# Patient Record
Sex: FEMALE | Race: WHITE | NOT HISPANIC OR LATINO | URBAN - METROPOLITAN AREA
[De-identification: names, ages, dates, MRNs, and addresses within clinical notes are randomized per-mention and may not be internally consistent; named-entity substitution may affect disease eponyms.]

---

## 2023-04-24 ENCOUNTER — TELEPHONE (OUTPATIENT)
Dept: PRIMARY CARE | Facility: CLINIC | Age: 33
End: 2023-04-24
Payer: COMMERCIAL

## 2023-04-26 ENCOUNTER — APPOINTMENT (OUTPATIENT)
Dept: PRIMARY CARE | Facility: CLINIC | Age: 33
End: 2023-04-26
Payer: COMMERCIAL

## 2023-06-10 DIAGNOSIS — F41.9 ANXIETY DISORDER, UNSPECIFIED: ICD-10-CM

## 2023-06-14 RX ORDER — FLUOXETINE HYDROCHLORIDE 40 MG/1
CAPSULE ORAL
Qty: 30 CAPSULE | Refills: 0 | Status: SHIPPED | OUTPATIENT
Start: 2023-06-14 | End: 2023-07-12 | Stop reason: SDUPTHER

## 2023-07-12 ENCOUNTER — OFFICE VISIT (OUTPATIENT)
Dept: PRIMARY CARE | Facility: CLINIC | Age: 33
End: 2023-07-12
Payer: COMMERCIAL

## 2023-07-12 VITALS
SYSTOLIC BLOOD PRESSURE: 96 MMHG | RESPIRATION RATE: 16 BRPM | TEMPERATURE: 99.4 F | OXYGEN SATURATION: 98 % | DIASTOLIC BLOOD PRESSURE: 72 MMHG | HEART RATE: 74 BPM

## 2023-07-12 DIAGNOSIS — F41.9 ANXIETY DISORDER, UNSPECIFIED: ICD-10-CM

## 2023-07-12 PROCEDURE — 1036F TOBACCO NON-USER: CPT | Performed by: FAMILY MEDICINE

## 2023-07-12 PROCEDURE — 99213 OFFICE O/P EST LOW 20 MIN: CPT | Performed by: FAMILY MEDICINE

## 2023-07-12 RX ORDER — FLUOXETINE HYDROCHLORIDE 40 MG/1
40 CAPSULE ORAL DAILY
Qty: 90 CAPSULE | Refills: 1 | Status: SHIPPED | OUTPATIENT
Start: 2023-07-12 | End: 2024-02-23 | Stop reason: SDUPTHER

## 2023-07-12 NOTE — PROGRESS NOTES
Subjective   Patient ID: Megan Morris is a 33 y.o. female who presents for Anxiety (Follow up ).    Anxiety  Patient is here for evaluation of anxiety.  She has the following anxiety symptoms: palpitations. Onset of symptoms was approximately several years ago.  Symptoms have been stable since that time. She denies current suicidal and homicidal ideation. Family history significant for anxiety.Possible organic causes contributing are: none. Risk factors: positive family history in  father and sister(s) Previous treatment includes individual therapy and medication Prozac.   She complains of the following medication side effects: none.    Talking to a therapist and this is working well for her.    Anxiety             Review of Systems    Objective   BP 96/72   Pulse 74   Temp 37.4 °C (99.4 °F)   Resp 16   LMP 06/18/2023   SpO2 98%     Physical Exam  Constitutional:       Appearance: Normal appearance.   HENT:      Head: Normocephalic and atraumatic.      Mouth/Throat:      Mouth: Mucous membranes are moist.   Eyes:      Pupils: Pupils are equal, round, and reactive to light.   Cardiovascular:      Rate and Rhythm: Normal rate.      Pulses: Normal pulses.   Pulmonary:      Effort: Pulmonary effort is normal. No respiratory distress.   Musculoskeletal:      Cervical back: Normal range of motion and neck supple.   Skin:     General: Skin is warm and dry.   Neurological:      General: No focal deficit present.      Mental Status: She is alert and oriented to person, place, and time. Mental status is at baseline.   Psychiatric:         Mood and Affect: Mood normal.         Behavior: Behavior normal.         Thought Content: Thought content normal.         Judgment: Judgment normal.         Assessment/Plan   Diagnoses and all orders for this visit:  Anxiety disorder, unspecified  -     FLUoxetine (PROzac) 40 mg capsule; Take 1 capsule (40 mg) by mouth once daily.

## 2023-07-12 NOTE — PATIENT INSTRUCTIONS
Today we followed up on your anxiety.  You are doing great on your current medication.  Refills Sent.  Consider CBT/therapy to help with other underlying issues if needed.      Please make an appointment to follow up for your Annual Physical.

## 2023-09-13 ENCOUNTER — OFFICE VISIT (OUTPATIENT)
Dept: PRIMARY CARE | Facility: CLINIC | Age: 33
End: 2023-09-13
Payer: COMMERCIAL

## 2023-09-13 VITALS
BODY MASS INDEX: 22.14 KG/M2 | TEMPERATURE: 98.1 F | HEIGHT: 61 IN | HEART RATE: 80 BPM | SYSTOLIC BLOOD PRESSURE: 94 MMHG | DIASTOLIC BLOOD PRESSURE: 70 MMHG | RESPIRATION RATE: 16 BRPM | OXYGEN SATURATION: 96 %

## 2023-09-13 DIAGNOSIS — R42 VERTIGO: ICD-10-CM

## 2023-09-13 DIAGNOSIS — H81.12 BPPV (BENIGN PAROXYSMAL POSITIONAL VERTIGO), LEFT: Primary | ICD-10-CM

## 2023-09-13 LAB
POC APPEARANCE, URINE: CLEAR
POC BILIRUBIN, URINE: NEGATIVE
POC BLOOD, URINE: NEGATIVE
POC COLOR, URINE: YELLOW
POC GLUCOSE, URINE: NEGATIVE MG/DL
POC KETONES, URINE: NEGATIVE MG/DL
POC LEUKOCYTES, URINE: NEGATIVE
POC NITRITE,URINE: NEGATIVE
POC PH, URINE: 7 PH
POC PROTEIN, URINE: NEGATIVE MG/DL
POC SPECIFIC GRAVITY, URINE: 1.01
POC UROBILINOGEN, URINE: 0.2 EU/DL
PREGNANCY TEST URINE, POC: NEGATIVE

## 2023-09-13 PROCEDURE — 81025 URINE PREGNANCY TEST: CPT | Performed by: FAMILY MEDICINE

## 2023-09-13 PROCEDURE — 81003 URINALYSIS AUTO W/O SCOPE: CPT | Performed by: FAMILY MEDICINE

## 2023-09-13 PROCEDURE — 1036F TOBACCO NON-USER: CPT | Performed by: FAMILY MEDICINE

## 2023-09-13 PROCEDURE — 99214 OFFICE O/P EST MOD 30 MIN: CPT | Performed by: FAMILY MEDICINE

## 2023-09-13 RX ORDER — MECLIZINE HCL 12.5 MG 12.5 MG/1
12.5 TABLET ORAL 3 TIMES DAILY PRN
Qty: 30 TABLET | Refills: 0 | Status: SHIPPED | OUTPATIENT
Start: 2023-09-13 | End: 2023-10-31 | Stop reason: ALTCHOICE

## 2023-09-13 ASSESSMENT — ENCOUNTER SYMPTOMS: DIZZINESS: 1

## 2023-09-13 NOTE — PATIENT INSTRUCTIONS
Today we have addressed your BPPV.    I have given you a handout for the Epley maneuver and rx antivert for you  I advise not going to the chiropractor tomorrow until your symptoms have resolved  Pregnancy was ruled out as well.      Follow up if no improvement or if symptoms worsen.

## 2023-09-13 NOTE — PROGRESS NOTES
"Subjective   Patient ID: Megan Morris is a 33 y.o. female who presents for Dizziness (Happened on Friday, last several hours. She fell over, did not lose consciousness.  The last couple of days she has felt dizzy on and off. Staying hydrated.  ).    She was doing yoga on Friday around lunch time.  She had eaten right before class.  It was at her house.  She did trichonasana and then stood up and fell backwards.  She just laid for a little while until she felt ok.  She felt a little nauseous as well.  The dizziness lasted a few hours.  She didn't feel her heart racing. She thinks maybe the room was spinning a little.  She denies any recent illness.    For the last 3 days she has felt off balance as well.  She is not currently on her period. She doesn't think she is pregnant she has no tubes.    She went to a chiropractor this morning because she was having neck pain he didn't do any treatment today though.      Dizziness         Review of Systems   Neurological:  Positive for dizziness.       Objective   BP 94/70   Pulse 80   Temp 36.7 °C (98.1 °F)   Resp 16   Ht 1.549 m (5' 1\")   LMP 08/18/2023 (Approximate)   SpO2 96%   BMI 22.14 kg/m²     Physical Exam  Constitutional:       Appearance: Normal appearance.   HENT:      Head: Normocephalic.      Right Ear: Tympanic membrane normal.      Left Ear: Tympanic membrane normal.      Nose: Nose normal.      Mouth/Throat:      Mouth: Mucous membranes are moist.   Eyes:      Pupils: Pupils are equal, round, and reactive to light.   Cardiovascular:      Rate and Rhythm: Normal rate and regular rhythm.   Pulmonary:      Effort: Pulmonary effort is normal.      Breath sounds: Normal breath sounds.   Musculoskeletal:      Cervical back: Normal range of motion.   Skin:     General: Skin is warm.   Neurological:      General: No focal deficit present.      Mental Status: She is alert and oriented to person, place, and time.      Coordination: Coordination normal.      " Deep Tendon Reflexes: Reflexes normal.      Comments: +joyce hallpike to the left   Psychiatric:         Mood and Affect: Mood normal.         Assessment/Plan   There are no diagnoses linked to this encounter.

## 2023-10-13 ENCOUNTER — APPOINTMENT (OUTPATIENT)
Dept: PRIMARY CARE | Facility: CLINIC | Age: 33
End: 2023-10-13
Payer: COMMERCIAL

## 2023-10-31 ENCOUNTER — OFFICE VISIT (OUTPATIENT)
Dept: PRIMARY CARE | Facility: CLINIC | Age: 33
End: 2023-10-31
Payer: COMMERCIAL

## 2023-10-31 ENCOUNTER — LAB (OUTPATIENT)
Dept: LAB | Facility: LAB | Age: 33
End: 2023-10-31
Payer: COMMERCIAL

## 2023-10-31 VITALS
TEMPERATURE: 98 F | SYSTOLIC BLOOD PRESSURE: 118 MMHG | DIASTOLIC BLOOD PRESSURE: 70 MMHG | RESPIRATION RATE: 16 BRPM | HEART RATE: 76 BPM

## 2023-10-31 DIAGNOSIS — Z00.00 WELL ADULT EXAM: ICD-10-CM

## 2023-10-31 DIAGNOSIS — Z00.00 WELL ADULT EXAM: Primary | ICD-10-CM

## 2023-10-31 LAB
ANION GAP SERPL CALC-SCNC: 14 MMOL/L (ref 10–20)
BUN SERPL-MCNC: 14 MG/DL (ref 6–23)
CALCIUM SERPL-MCNC: 9.8 MG/DL (ref 8.6–10.3)
CHLORIDE SERPL-SCNC: 101 MMOL/L (ref 98–107)
CHOLEST SERPL-MCNC: 192 MG/DL (ref 0–199)
CHOLESTEROL/HDL RATIO: 2.7
CO2 SERPL-SCNC: 27 MMOL/L (ref 21–32)
CREAT SERPL-MCNC: 0.83 MG/DL (ref 0.5–1.05)
ERYTHROCYTE [DISTWIDTH] IN BLOOD BY AUTOMATED COUNT: 11.8 % (ref 11.5–14.5)
GFR SERPL CREATININE-BSD FRML MDRD: >90 ML/MIN/1.73M*2
GLUCOSE SERPL-MCNC: 85 MG/DL (ref 74–99)
HCT VFR BLD AUTO: 41.2 % (ref 36–46)
HDLC SERPL-MCNC: 71.4 MG/DL
HGB BLD-MCNC: 14.3 G/DL (ref 12–16)
LDLC SERPL CALC-MCNC: 95 MG/DL
MCH RBC QN AUTO: 32.6 PG (ref 26–34)
MCHC RBC AUTO-ENTMCNC: 34.7 G/DL (ref 32–36)
MCV RBC AUTO: 94 FL (ref 80–100)
NON HDL CHOLESTEROL: 121 MG/DL (ref 0–149)
NRBC BLD-RTO: 0 /100 WBCS (ref 0–0)
PLATELET # BLD AUTO: 247 X10*3/UL (ref 150–450)
PMV BLD AUTO: 10.8 FL (ref 7.5–11.5)
POTASSIUM SERPL-SCNC: 4.2 MMOL/L (ref 3.5–5.3)
RBC # BLD AUTO: 4.39 X10*6/UL (ref 4–5.2)
SODIUM SERPL-SCNC: 138 MMOL/L (ref 136–145)
TRIGL SERPL-MCNC: 128 MG/DL (ref 0–149)
VLDL: 26 MG/DL (ref 0–40)
WBC # BLD AUTO: 5.7 X10*3/UL (ref 4.4–11.3)

## 2023-10-31 PROCEDURE — 85027 COMPLETE CBC AUTOMATED: CPT

## 2023-10-31 PROCEDURE — 80048 BASIC METABOLIC PNL TOTAL CA: CPT

## 2023-10-31 PROCEDURE — 1036F TOBACCO NON-USER: CPT

## 2023-10-31 PROCEDURE — 36415 COLL VENOUS BLD VENIPUNCTURE: CPT

## 2023-10-31 PROCEDURE — 99395 PREV VISIT EST AGE 18-39: CPT

## 2023-10-31 PROCEDURE — 80061 LIPID PANEL: CPT

## 2023-11-27 ENCOUNTER — APPOINTMENT (OUTPATIENT)
Dept: PRIMARY CARE | Facility: CLINIC | Age: 33
End: 2023-11-27
Payer: COMMERCIAL

## 2023-12-13 ENCOUNTER — APPOINTMENT (OUTPATIENT)
Dept: PRIMARY CARE | Facility: CLINIC | Age: 33
End: 2023-12-13
Payer: COMMERCIAL

## 2024-02-07 ENCOUNTER — TELEPHONE (OUTPATIENT)
Dept: PRIMARY CARE | Facility: CLINIC | Age: 34
End: 2024-02-07
Payer: COMMERCIAL

## 2024-02-07 NOTE — TELEPHONE ENCOUNTER
Patient would like a referral placed for PT for her neck pain.  She said she has had neck pain for over a year and a half and has been seeing a Chiropractor, ayse that is not helping.

## 2024-02-23 DIAGNOSIS — F41.9 ANXIETY DISORDER, UNSPECIFIED: ICD-10-CM

## 2024-02-23 RX ORDER — FLUOXETINE HYDROCHLORIDE 40 MG/1
40 CAPSULE ORAL DAILY
Qty: 30 CAPSULE | Refills: 0 | Status: SHIPPED | OUTPATIENT
Start: 2024-02-23 | End: 2024-02-26 | Stop reason: SDUPTHER

## 2024-02-26 DIAGNOSIS — F41.9 ANXIETY DISORDER, UNSPECIFIED: ICD-10-CM

## 2024-02-26 RX ORDER — FLUOXETINE HYDROCHLORIDE 40 MG/1
40 CAPSULE ORAL DAILY
Qty: 90 CAPSULE | Refills: 0 | Status: SHIPPED | OUTPATIENT
Start: 2024-02-26 | End: 2024-02-28 | Stop reason: SDUPTHER

## 2024-02-26 RX ORDER — FLUOXETINE HYDROCHLORIDE 40 MG/1
40 CAPSULE ORAL DAILY
Qty: 90 CAPSULE | Refills: 3 | Status: SHIPPED | OUTPATIENT
Start: 2024-02-26 | End: 2024-02-26 | Stop reason: SDUPTHER

## 2024-02-28 ENCOUNTER — OFFICE VISIT (OUTPATIENT)
Dept: PRIMARY CARE | Facility: CLINIC | Age: 34
End: 2024-02-28
Payer: COMMERCIAL

## 2024-02-28 VITALS
OXYGEN SATURATION: 97 % | RESPIRATION RATE: 16 BRPM | HEART RATE: 87 BPM | SYSTOLIC BLOOD PRESSURE: 102 MMHG | DIASTOLIC BLOOD PRESSURE: 68 MMHG | TEMPERATURE: 98.1 F

## 2024-02-28 DIAGNOSIS — F41.9 ANXIETY: Primary | ICD-10-CM

## 2024-02-28 DIAGNOSIS — M54.2 NECK PAIN: ICD-10-CM

## 2024-02-28 PROCEDURE — 99214 OFFICE O/P EST MOD 30 MIN: CPT | Performed by: FAMILY MEDICINE

## 2024-02-28 PROCEDURE — 1036F TOBACCO NON-USER: CPT | Performed by: FAMILY MEDICINE

## 2024-02-28 RX ORDER — FLUOXETINE HYDROCHLORIDE 40 MG/1
40 CAPSULE ORAL DAILY
Qty: 90 CAPSULE | Refills: 1 | Status: SHIPPED | OUTPATIENT
Start: 2024-02-28 | End: 2025-02-27

## 2024-02-28 NOTE — PATIENT INSTRUCTIONS
Today we followed up on your anxiety.  You are doing great on your current medication.  Refills Sent.  Continue CBT/therapy to help with other underlying issues if needed.     For your neck pain I have advised Xray to make sure there is no underlying structural issue and that you also follow up with PT.  Consider nsaids prn and maybe a muscle relaxer if we see spasm on the xray as well

## 2024-02-28 NOTE — PROGRESS NOTES
Subjective   Patient ID: Megan Morris is a 33 y.o. female who presents for Anxiety (Follow up ).    She is doing well on the fluoxetine.  She doesn't sleep well because her kids don't sleep well.  Her kids come into her bed in the middle of the night.    She talks to someone as well and has no depression symptoms at all    She has been having neck pain.  She denies any injury.  She states its been there for almost 2 years.  Cannot pinpoint what triggered it.  No numbness or tingling or weakness in the arms.  Sometimes she has to take ibuprofen and it kind of helps.  No injury.  She saw a chiropractor and it didn't stick.  She went to for months.    Anxiety             Review of Systems    Objective   /68   Pulse 87   Temp 36.7 °C (98.1 °F)   Resp 16   LMP 02/08/2024 (Approximate)   SpO2 97%     Physical Exam  Constitutional:       Appearance: Normal appearance.   HENT:      Head: Normocephalic and atraumatic.      Mouth/Throat:      Mouth: Mucous membranes are moist.   Eyes:      Pupils: Pupils are equal, round, and reactive to light.   Cardiovascular:      Rate and Rhythm: Normal rate.      Pulses: Normal pulses.   Musculoskeletal:         General: Tenderness (ttp OA bilaterally, decreased ROM to extension, left side bending and rotation) present.      Cervical back: Normal range of motion and neck supple.   Skin:     General: Skin is warm and dry.   Neurological:      General: No focal deficit present.      Mental Status: She is alert and oriented to person, place, and time. Mental status is at baseline.   Psychiatric:         Mood and Affect: Mood normal.         Behavior: Behavior normal.         Thought Content: Thought content normal.         Judgment: Judgment normal.         Assessment/Plan   Diagnoses and all orders for this visit:  Anxiety  -     FLUoxetine (PROzac) 40 mg capsule; Take 1 capsule (40 mg) by mouth once daily.  Neck pain  -     XR cervical spine 2-3 views; Future  -      Referral to Physical Therapy; Future

## 2024-10-28 ENCOUNTER — APPOINTMENT (OUTPATIENT)
Dept: PRIMARY CARE | Facility: CLINIC | Age: 34
End: 2024-10-28
Payer: COMMERCIAL

## 2024-10-28 VITALS
RESPIRATION RATE: 16 BRPM | BODY MASS INDEX: 22.14 KG/M2 | HEART RATE: 87 BPM | DIASTOLIC BLOOD PRESSURE: 66 MMHG | OXYGEN SATURATION: 98 % | HEIGHT: 61 IN | TEMPERATURE: 98.3 F | SYSTOLIC BLOOD PRESSURE: 102 MMHG

## 2024-10-28 DIAGNOSIS — Z00.00 HEALTHCARE MAINTENANCE: Primary | ICD-10-CM

## 2024-10-28 DIAGNOSIS — F41.9 ANXIETY: ICD-10-CM

## 2024-10-28 DIAGNOSIS — Z12.4 CERVICAL CANCER SCREENING: ICD-10-CM

## 2024-10-28 LAB
POC APPEARANCE, URINE: CLEAR
POC BILIRUBIN, URINE: NEGATIVE
POC BLOOD, URINE: NEGATIVE
POC COLOR, URINE: YELLOW
POC GLUCOSE, URINE: NEGATIVE MG/DL
POC KETONES, URINE: NEGATIVE MG/DL
POC LEUKOCYTES, URINE: NEGATIVE
POC NITRITE,URINE: NEGATIVE
POC PH, URINE: 7.5 PH
POC PROTEIN, URINE: NEGATIVE MG/DL
POC SPECIFIC GRAVITY, URINE: 1.01
POC UROBILINOGEN, URINE: 0.2 EU/DL

## 2024-10-28 PROCEDURE — 81002 URINALYSIS NONAUTO W/O SCOPE: CPT | Performed by: FAMILY MEDICINE

## 2024-10-28 PROCEDURE — 1036F TOBACCO NON-USER: CPT | Performed by: FAMILY MEDICINE

## 2024-10-28 PROCEDURE — 99395 PREV VISIT EST AGE 18-39: CPT | Performed by: FAMILY MEDICINE

## 2024-10-28 RX ORDER — MULTIVIT-MIN/IRON FUM/FOLIC AC 7.5 MG-4
1 TABLET ORAL DAILY
COMMUNITY

## 2024-10-28 RX ORDER — FLUOXETINE HYDROCHLORIDE 40 MG/1
40 CAPSULE ORAL DAILY
Qty: 90 CAPSULE | Refills: 1 | Status: SHIPPED | OUTPATIENT
Start: 2024-10-28 | End: 2025-10-28

## 2024-10-29 ENCOUNTER — LAB (OUTPATIENT)
Dept: LAB | Facility: LAB | Age: 34
End: 2024-10-29
Payer: COMMERCIAL

## 2024-10-29 DIAGNOSIS — Z00.00 HEALTHCARE MAINTENANCE: ICD-10-CM

## 2024-10-29 LAB
25(OH)D3 SERPL-MCNC: 26 NG/ML (ref 30–100)
ALBUMIN SERPL BCP-MCNC: 4.3 G/DL (ref 3.4–5)
ALP SERPL-CCNC: 34 U/L (ref 33–110)
ALT SERPL W P-5'-P-CCNC: 20 U/L (ref 7–45)
ANION GAP SERPL CALC-SCNC: 9 MMOL/L (ref 10–20)
AST SERPL W P-5'-P-CCNC: 21 U/L (ref 9–39)
BILIRUB SERPL-MCNC: 0.6 MG/DL (ref 0–1.2)
BUN SERPL-MCNC: 11 MG/DL (ref 6–23)
CALCIUM SERPL-MCNC: 9.2 MG/DL (ref 8.6–10.3)
CHLORIDE SERPL-SCNC: 102 MMOL/L (ref 98–107)
CHOLEST SERPL-MCNC: 187 MG/DL (ref 0–199)
CHOLESTEROL/HDL RATIO: 2.8
CO2 SERPL-SCNC: 31 MMOL/L (ref 21–32)
CREAT SERPL-MCNC: 0.77 MG/DL (ref 0.5–1.05)
EGFRCR SERPLBLD CKD-EPI 2021: >90 ML/MIN/1.73M*2
ERYTHROCYTE [DISTWIDTH] IN BLOOD BY AUTOMATED COUNT: 11.9 % (ref 11.5–14.5)
GLUCOSE SERPL-MCNC: 86 MG/DL (ref 74–99)
HCT VFR BLD AUTO: 40.5 % (ref 36–46)
HDLC SERPL-MCNC: 66.6 MG/DL
HGB BLD-MCNC: 13.4 G/DL (ref 12–16)
LDLC SERPL CALC-MCNC: 96 MG/DL
MCH RBC QN AUTO: 32 PG (ref 26–34)
MCHC RBC AUTO-ENTMCNC: 33.1 G/DL (ref 32–36)
MCV RBC AUTO: 97 FL (ref 80–100)
NON HDL CHOLESTEROL: 120 MG/DL (ref 0–149)
NRBC BLD-RTO: 0 /100 WBCS (ref 0–0)
PLATELET # BLD AUTO: 223 X10*3/UL (ref 150–450)
POTASSIUM SERPL-SCNC: 4.4 MMOL/L (ref 3.5–5.3)
PROT SERPL-MCNC: 6.3 G/DL (ref 6.4–8.2)
RBC # BLD AUTO: 4.19 X10*6/UL (ref 4–5.2)
SODIUM SERPL-SCNC: 138 MMOL/L (ref 136–145)
TRIGL SERPL-MCNC: 122 MG/DL (ref 0–149)
TSH SERPL-ACNC: 1.37 MIU/L (ref 0.44–3.98)
VLDL: 24 MG/DL (ref 0–40)
WBC # BLD AUTO: 4 X10*3/UL (ref 4.4–11.3)

## 2024-10-29 PROCEDURE — 84443 ASSAY THYROID STIM HORMONE: CPT

## 2024-10-29 PROCEDURE — 36415 COLL VENOUS BLD VENIPUNCTURE: CPT

## 2024-10-29 PROCEDURE — 80061 LIPID PANEL: CPT

## 2024-10-29 PROCEDURE — 85027 COMPLETE CBC AUTOMATED: CPT

## 2024-10-29 PROCEDURE — 82306 VITAMIN D 25 HYDROXY: CPT

## 2024-10-29 PROCEDURE — 80053 COMPREHEN METABOLIC PANEL: CPT

## 2024-10-30 DIAGNOSIS — R79.89 ABNORMAL CBC: ICD-10-CM

## 2024-10-30 DIAGNOSIS — E55.9 HYPOVITAMINOSIS D: Primary | ICD-10-CM

## 2024-10-30 RX ORDER — ERGOCALCIFEROL 1.25 MG/1
50000 CAPSULE ORAL
Qty: 12 CAPSULE | Refills: 0 | Status: SHIPPED | OUTPATIENT
Start: 2024-11-03 | End: 2025-01-26

## 2025-01-23 ENCOUNTER — TELEPHONE (OUTPATIENT)
Facility: CLINIC | Age: 35
End: 2025-01-23
Payer: COMMERCIAL

## 2025-01-24 DIAGNOSIS — E55.9 HYPOVITAMINOSIS D: Primary | ICD-10-CM

## 2025-01-24 NOTE — PROGRESS NOTES
Current Outpatient Medications   Medication Sig Dispense Refill    ergocalciferol (Vitamin D-2) 1.25 MG (63723 UT) capsule Take 1 capsule (50,000 Units) by mouth 1 (one) time per week. 12 capsule 0    FLUoxetine (PROzac) 40 mg capsule Take 1 capsule (40 mg) by mouth once daily. 90 capsule 1    multivitamin with minerals tablet Take 1 tablet by mouth once daily.       No current facility-administered medications for this visit.

## 2025-01-28 DIAGNOSIS — E55.9 HYPOVITAMINOSIS D: ICD-10-CM

## 2025-01-29 RX ORDER — ERGOCALCIFEROL 1.25 MG/1
50000 CAPSULE ORAL
Qty: 12 CAPSULE | Refills: 0 | OUTPATIENT
Start: 2025-02-02

## 2025-02-06 ENCOUNTER — TELEPHONE (OUTPATIENT)
Facility: CLINIC | Age: 35
End: 2025-02-06
Payer: COMMERCIAL

## 2025-02-06 DIAGNOSIS — R79.89 ABNORMAL CBC: Primary | ICD-10-CM

## 2025-02-06 LAB
25(OH)D3+25(OH)D2 SERPL-MCNC: 65 NG/ML (ref 30–100)
BASOPHILS # BLD AUTO: 32 CELLS/UL (ref 0–200)
BASOPHILS NFR BLD AUTO: 0.9 %
EOSINOPHIL # BLD AUTO: 49 CELLS/UL (ref 15–500)
EOSINOPHIL NFR BLD AUTO: 1.4 %
ERYTHROCYTE [DISTWIDTH] IN BLOOD BY AUTOMATED COUNT: 11.6 % (ref 11–15)
HCT VFR BLD AUTO: 43 % (ref 35–45)
HGB BLD-MCNC: 14.4 G/DL (ref 11.7–15.5)
LYMPHOCYTES # BLD AUTO: 1127 CELLS/UL (ref 850–3900)
LYMPHOCYTES NFR BLD AUTO: 32.2 %
MCH RBC QN AUTO: 32.2 PG (ref 27–33)
MCHC RBC AUTO-ENTMCNC: 33.5 G/DL (ref 32–36)
MCV RBC AUTO: 96.2 FL (ref 80–100)
MONOCYTES # BLD AUTO: 270 CELLS/UL (ref 200–950)
MONOCYTES NFR BLD AUTO: 7.7 %
NEUTROPHILS # BLD AUTO: 2023 CELLS/UL (ref 1500–7800)
NEUTROPHILS NFR BLD AUTO: 57.8 %
PLATELET # BLD AUTO: 237 THOUSAND/UL (ref 140–400)
PMV BLD REES-ECKER: 10.9 FL (ref 7.5–12.5)
RBC # BLD AUTO: 4.47 MILLION/UL (ref 3.8–5.1)
WBC # BLD AUTO: 3.5 THOUSAND/UL (ref 3.8–10.8)

## 2025-02-10 ENCOUNTER — TELEPHONE (OUTPATIENT)
Facility: CLINIC | Age: 35
End: 2025-02-10
Payer: COMMERCIAL

## 2025-02-10 NOTE — TELEPHONE ENCOUNTER
Please relay the following lab results to patient:     Per Dr. Norton: Your white blood cells continue to be slightly low, but your white blood cell differential is normal. I would like to check a B12 level because sometimes that can contribute to abnormalities. Well, I am not too concerned. I would like to know why your white blood cells are low just so that we can understand from the future. I can place a referral for hematology for you, but it is not an emergency. Please let me know if you have any questions.

## 2025-02-12 LAB — VIT B12 SERPL-MCNC: 546 PG/ML (ref 200–1100)

## 2025-05-01 DIAGNOSIS — F41.9 ANXIETY: ICD-10-CM

## 2025-05-02 NOTE — TELEPHONE ENCOUNTER
Pt  needs appt before any more refills.  Pt is due for a 6 mos med f-up.  Can you please schedule?

## 2025-05-05 RX ORDER — FLUOXETINE HYDROCHLORIDE 40 MG/1
40 CAPSULE ORAL DAILY
Qty: 30 CAPSULE | Refills: 0 | Status: SHIPPED | OUTPATIENT
Start: 2025-05-05

## 2025-05-28 ENCOUNTER — TELEPHONE (OUTPATIENT)
Facility: CLINIC | Age: 35
End: 2025-05-28
Payer: COMMERCIAL

## 2025-05-28 DIAGNOSIS — F41.9 ANXIETY: ICD-10-CM

## 2025-05-28 RX ORDER — FLUOXETINE HYDROCHLORIDE 40 MG/1
40 CAPSULE ORAL DAILY
Qty: 90 CAPSULE | Refills: 0 | Status: SHIPPED | OUTPATIENT
Start: 2025-05-28 | End: 2025-06-02 | Stop reason: SDUPTHER

## 2025-06-02 ENCOUNTER — APPOINTMENT (OUTPATIENT)
Facility: CLINIC | Age: 35
End: 2025-06-02
Payer: COMMERCIAL

## 2025-06-02 VITALS
TEMPERATURE: 98.2 F | SYSTOLIC BLOOD PRESSURE: 110 MMHG | HEART RATE: 77 BPM | DIASTOLIC BLOOD PRESSURE: 72 MMHG | OXYGEN SATURATION: 98 % | RESPIRATION RATE: 16 BRPM

## 2025-06-02 DIAGNOSIS — F41.9 ANXIETY: ICD-10-CM

## 2025-06-02 PROCEDURE — 99213 OFFICE O/P EST LOW 20 MIN: CPT | Performed by: FAMILY MEDICINE

## 2025-06-02 RX ORDER — FLUOXETINE HYDROCHLORIDE 40 MG/1
40 CAPSULE ORAL DAILY
Qty: 90 CAPSULE | Refills: 1 | Status: SHIPPED | OUTPATIENT
Start: 2025-06-02

## 2025-06-02 NOTE — PROGRESS NOTES
Subjective   Patient ID: Megan Morris is a 35 y.o. female who presents for Anxiety.      We are following up on her fluoxetine no changes.  She is sleeping ok, appetite and energy.  She talks to someone therapist every 3 to 4 weeks and a couples counselor every 2 weeks.    Anxiety            Review of Systems    Current Outpatient Medications   Medication Instructions    FLUoxetine (PROZAC) 40 mg, oral, Daily    multivitamin with minerals tablet 1 tablet, oral, Daily       RX Allergies[1]  Patient has no known allergies.    Past Medical History:  07/12/2023: Anxiety    Social History     Tobacco Use    Smoking status: Never    Smokeless tobacco: Never   Substance Use Topics    Alcohol use: Yes     Comment: socially       Objective     Vitals:    06/02/25 1030   BP: 110/72   Pulse: 77   Resp: 16   Temp: 36.8 °C (98.2 °F)   SpO2: 98%     Patient's last menstrual period was 05/31/2025.    Physical Exam  Constitutional:       Appearance: Normal appearance.   HENT:      Head: Normocephalic and atraumatic.      Mouth/Throat:      Mouth: Mucous membranes are moist.   Eyes:      Pupils: Pupils are equal, round, and reactive to light.   Cardiovascular:      Rate and Rhythm: Normal rate.      Pulses: Normal pulses.   Musculoskeletal:      Cervical back: Normal range of motion and neck supple.   Skin:     General: Skin is warm and dry.   Neurological:      General: No focal deficit present.      Mental Status: She is alert and oriented to person, place, and time. Mental status is at baseline.   Psychiatric:         Mood and Affect: Mood normal.         Behavior: Behavior normal.         Thought Content: Thought content normal.         Judgment: Judgment normal.         Assessment/Plan   Diagnoses and all orders for this visit:  Anxiety  -     FLUoxetine (PROzac) 40 mg capsule; Take 1 capsule (40 mg) by mouth once daily.                 [1] No Known Allergies

## 2025-06-26 ENCOUNTER — TELEPHONE (OUTPATIENT)
Facility: CLINIC | Age: 35
End: 2025-06-26
Payer: COMMERCIAL

## 2025-06-26 NOTE — TELEPHONE ENCOUNTER
Patient would like to know if you could send something in for her to help with her Anxiety when she's flying.          [facility] - [place], [place] - [address] [facility] [address]  [address] [address] [address]  Phone: [phone]  Fax: [phone]

## 2025-07-11 ENCOUNTER — APPOINTMENT (OUTPATIENT)
Facility: CLINIC | Age: 35
End: 2025-07-11
Payer: COMMERCIAL

## 2025-08-06 ENCOUNTER — APPOINTMENT (OUTPATIENT)
Facility: CLINIC | Age: 35
End: 2025-08-06
Payer: COMMERCIAL

## 2025-08-06 VITALS
SYSTOLIC BLOOD PRESSURE: 108 MMHG | DIASTOLIC BLOOD PRESSURE: 64 MMHG | RESPIRATION RATE: 18 BRPM | HEART RATE: 76 BPM | TEMPERATURE: 98.5 F | HEIGHT: 61 IN | BODY MASS INDEX: 22.14 KG/M2 | OXYGEN SATURATION: 96 %

## 2025-08-06 DIAGNOSIS — R19.4 CHANGE IN BOWEL HABITS: ICD-10-CM

## 2025-08-06 DIAGNOSIS — F41.8 SITUATIONAL ANXIETY: ICD-10-CM

## 2025-08-06 DIAGNOSIS — M25.50 ARTHRALGIA, UNSPECIFIED JOINT: ICD-10-CM

## 2025-08-06 DIAGNOSIS — F40.243 FEAR OF FLYING: Primary | ICD-10-CM

## 2025-08-06 PROCEDURE — 99214 OFFICE O/P EST MOD 30 MIN: CPT | Performed by: FAMILY MEDICINE

## 2025-08-06 PROCEDURE — 1036F TOBACCO NON-USER: CPT | Performed by: FAMILY MEDICINE

## 2025-08-06 RX ORDER — HYDROXYZINE HYDROCHLORIDE 25 MG/1
25 TABLET, FILM COATED ORAL 2 TIMES DAILY PRN
Qty: 56 TABLET | Refills: 0 | Status: SHIPPED | OUTPATIENT
Start: 2025-08-06 | End: 2025-09-03

## 2025-08-06 NOTE — PATIENT INSTRUCTIONS
Today we addressed your situational anxiety and fear of flying and after discussion decided to have you try hydroxyzine.     We also discussed your bowel changes and discomfort and fatigue and joint pain we will order some addition autoimmune testing and possibly have you follow up with GI.     Follow up when results received.

## 2025-08-06 NOTE — PROGRESS NOTES
"Subjective   Patient ID: Megan Morris is a 35 y.o. female who presents for Anxiety and Gluten Sensitivity.  She needs something for fear of flying and fear of vomiting (kids get car sick)    She has had a sensitive stomach with indigestion and upset stomach and diarrhea for a long time.  She gets fatigue and joint pain as well.  She thinks it's been going on for awhile - join pain for a year. Knees are the worst.  Fatigue has been for years.  No autoimmune diseases on family.      Anxiety            Review of Systems    Current Outpatient Medications   Medication Instructions    FLUoxetine (PROZAC) 40 mg, oral, Daily    hydrOXYzine HCL (ATARAX) 25 mg, oral, 2 times daily PRN    multivitamin with minerals tablet 1 tablet, Daily       RX Allergies[1]  Patient has no known allergies.    Past Medical History:  07/12/2023: Anxiety    Social History     Tobacco Use    Smoking status: Never    Smokeless tobacco: Never   Substance Use Topics    Alcohol use: Yes     Comment: socially       Objective     Vitals:    08/06/25 1358   BP: 108/64   BP Location: Left arm   Patient Position: Sitting   Pulse: 76   Resp: 18   Temp: 36.9 °C (98.5 °F)   TempSrc: Temporal   SpO2: 96%   Height: (!) 1.549 m (5' 1\")     Patient's last menstrual period was 07/28/2025.    Physical Exam  Constitutional:       Appearance: Normal appearance.   Pulmonary:      Effort: Pulmonary effort is normal. No respiratory distress.     Skin:     General: Skin is warm and dry.     Neurological:      General: No focal deficit present.      Mental Status: She is alert and oriented to person, place, and time.     Psychiatric:         Mood and Affect: Mood normal.         Behavior: Behavior normal.         Assessment/Plan   Diagnoses and all orders for this visit:  Fear of flying  -     hydrOXYzine HCL (Atarax) 25 mg tablet; Take 1 tablet (25 mg) by mouth 2 times a day as needed for anxiety for up to 28 days.  Situational anxiety  -     hydrOXYzine HCL " (Atarax) 25 mg tablet; Take 1 tablet (25 mg) by mouth 2 times a day as needed for anxiety for up to 28 days.  Arthralgia, unspecified joint  -     SIMONE with Reflex to MARIE; Future  -     Rheumatoid Factor; Future  Change in bowel habits  -     Celiac Panel; Future  -     Sedimentation Rate; Future                 [1] No Known Allergies

## 2025-08-08 LAB
ANA SER QL IF: NEGATIVE
ERYTHROCYTE [SEDIMENTATION RATE] IN BLOOD BY WESTERGREN METHOD: 2 MM/H
GLIADIN IGA SER IA-ACNC: <1 U/ML
GLIADIN IGG SER IA-ACNC: 1.3 U/ML
IGA SERPL-MCNC: 129 MG/DL (ref 47–310)
RHEUMATOID FACT SERPL-ACNC: <10 IU/ML
TTG IGA SER-ACNC: <1 U/ML
TTG IGG SER-ACNC: <1 U/ML

## 2025-08-15 ENCOUNTER — TELEPHONE (OUTPATIENT)
Facility: CLINIC | Age: 35
End: 2025-08-15
Payer: COMMERCIAL

## 2025-09-02 DIAGNOSIS — F40.243 FEAR OF FLYING: ICD-10-CM

## 2025-09-02 DIAGNOSIS — F41.8 SITUATIONAL ANXIETY: ICD-10-CM

## 2025-09-02 RX ORDER — HYDROXYZINE HYDROCHLORIDE 25 MG/1
25 TABLET, FILM COATED ORAL 2 TIMES DAILY PRN
Qty: 168 TABLET | Refills: 1 | OUTPATIENT
Start: 2025-09-02 | End: 2025-09-30

## 2025-10-29 ENCOUNTER — APPOINTMENT (OUTPATIENT)
Facility: CLINIC | Age: 35
End: 2025-10-29
Payer: COMMERCIAL